# Patient Record
(demographics unavailable — no encounter records)

---

## 2024-10-11 NOTE — HISTORY OF PRESENT ILLNESS
[de-identified] : The patient is a 43 year old female who played college volleyball. She notes back in 2003 is when her LBP first began. In april of this year her Right LBP started to become much worse. In now radiates posteriorly down her right leg/hamstring down the backside of her calf and to her foot. She reports pins and needles down her entire right leg to her foot and notes recently feels her right toes are weak. She is ambulating with crutches and notes the pain is significantly her quality of life.

## 2024-10-11 NOTE — ASSESSMENT
[FreeTextEntry1] : I, Dr. Smith, personally performed the evaluation and management (E/M) services for this new patient who presents today with (a) new problem. That E/M includes conducting the examination, assessing all new/exacerbated conditions, and establishing a new plan of care. Today, my ACP, Chhaya Edge, was here to observe my evaluation and management services for this new problem/exacerbated condition to be followed going forward.  PLAN: -  surgical intervention of right sided L5-S1 microdiscectomy 10/29/24 - preop clearance packet provided to patient - [facility] wipes and education provided

## 2024-10-11 NOTE — REVIEW OF SYSTEMS
[Leg Weakness] : leg weakness [Numbness] : numbness [Tingling] : tingling [Difficulty Walking] : difficulty walking [As Noted in HPI] : as noted in HPI [Chest Pain] : no chest pain [Palpitations] : no palpitations [Shortness Of Breath] : no shortness of breath

## 2024-10-11 NOTE — ADDENDUM
[FreeTextEntry1] : Patient Name: ADY BALLARD  Chief Complaint (CC): - Patient complains of ongoing pain in the back and leg.  History of Present Illness: - The 23-year-old patient reported having several medical issues including depression, a neurological condition involving 'bubbles on the membrane', and unspecified gastrointestinal issues. The neurological condition has been under monitoring by a specialist from [facility] for several years, but it has not grown nor applied any pressure, according to the doctors. Despite this, the patient experiences constant aches. The patient also reported taking Tofranil and Cyclobenzaprine for her conditions, along with a pain medication which name she couldn't recall. Recently, she developed a pain that started in her lower back in April after a long flight and progressively spread to her leg. The patient confirmed she had back pain during college when she played volleyball, indicating a possible long-standing issue with her back.  Past Medical History (PMH): - The patient has a history of depression, a diagnosed neurological condition monitored by a neurologist at [facility], and unspecified gastrointestinal issues. The patient has never had surgery before.  Family History (FH): - The patient's brother has a history of chronic back pain and has undergone back surgery.  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 She used to be an athlete, specifically a  in college.  Medications: - The patient is taking Tofranil, Cyclobenzaprine, and an unidentified pain medication recently prescribed by another doctor.  Allergies: - The patient reported no known allergies to any medication.  Review of Systems (ROS): - The patient reported discomfort in her lower back that progressively moved to her legs over a span of a few months. She also reported that her right foot felt very numb along with pain primarily in the back of her leg, which has decreased recently.  Physical Examination (PE): - Upon physical examination, the patient demonstrated difficulty standing on the toes of her right foot and experienced tightness when her leg was lifted. However, she was able to walk on her heels.  Laboratory/Data Review: - MRI images revealed the presence of a large disc herniation.  Assessment: - Based on the patient's history, symptoms, and the MRI results, she has a large disc herniation which most likely is causing her current back and leg pain, and weakness in her right foot.  Plan: - Given the size and location of the disc herniation, and considering the patient's physical examination, I recommend surgery. It seems the patient is an appropriate candidate for the procedure. Despite possible persistent numbness post operation, there is a high likelihood (approximately 95%) that the surgery will alleviate her pain. The recovery of strength tends to be a slow process. Other potential risks to consider include recurrence of the disc herniation, a spinal fluid leak, and injury to the nerve root. However, these are rare and can be managed.  Preventive Health: - Post-surgical measures will include careful monitoring of the patient's progress, physical therapy, and possibly adjusting her usage of current medications. A follow-up appointment will be set to discuss and finalize treatment options.   Playback Note copy-icon Copy to Clipboard Patient Name: ADY BALLARD  Chief Complaint (CC): - Patient presents with discomfort and weakness possibly due to disc problems.  History of Present Illness: - The patient has been experiencing discomfort and weakness associated to the problem areas identified in the spine - L3-4, L4-5, and L5-S1. The patient's discomfort stems from the worn discs in these regions and a large disc fragment at L5-1 that is protruding and impacting nerve roots. These disc conditions seems to have been developed over time and possibly contributed by aging. The condition persisted for a significant time before the discussion on possible surgical operations took place. The patient's MRI results are from two months ago.  Past Medical History (PMH): - No past medical history provided during this conversation.  Family History (FH): - Family medical history was not discussed during this visit.  Social History (SH): - Nothing about the patient's lifestyle or personal habits was discussed.  Medications: - No current medications were mentioned.  Allergies: - No known allergies were mentioned during this visit.  Review of Systems (ROS): - Review of spine revealed disc degradation at L3-4, L4-5, and L5-S1 as well as a large fragment disc at the L5-1.  Physical Examination (PE): - Visual interpretation of spine imagery showed significant signs of disc degradation.  Laboratory/Data Review: - Spinal imagery, specifically a MRI from August, showed signs of disc degradation.  Assessment: - The patient presents disc degradation at the L3-4, L4-5, and L5-S1 levels. The disc at L5-S1 has a large fragment that is impacting nerve roots causing discomfort and weakness in his foot. This condition is more common in older individuals  Plan: - Treatment plan involves spinal surgery to remove the large fragment in the L5-S1 disc. The operation, a right-sided L5 S1 microdiscectomy, involves removing the fragment and relieving the pressure on the nerve root. A pre-surgical medical clearance will be conducted consisting of lab tests, a chest X-ray and other checks to ensure the patient is fit for surgery. The surgery can be scheduled with a couple of weeks' notice. Potential for post-operative physical therapy depending on the recovery observations.  Preventive Health: - Discussion about possibility of repetitive epidural injections revealed potential risks including the increase in infection risk and potential for osteoporosis, especially for women. These potential consequences indicate the need for considering these risks before administering repetitive epidurals

## 2024-10-11 NOTE — ADDENDUM
[FreeTextEntry1] : Patient Name: ADY BALLARD  Chief Complaint (CC): - Patient complains of ongoing pain in the back and leg.  History of Present Illness: - The 23-year-old patient reported having several medical issues including depression, a neurological condition involving 'bubbles on the membrane', and unspecified gastrointestinal issues. The neurological condition has been under monitoring by a specialist from [facility] for several years, but it has not grown nor applied any pressure, according to the doctors. Despite this, the patient experiences constant aches. The patient also reported taking Tofranil and Cyclobenzaprine for her conditions, along with a pain medication which name she couldn't recall. Recently, she developed a pain that started in her lower back in April after a long flight and progressively spread to her leg. The patient confirmed she had back pain during college when she played volleyball, indicating a possible long-standing issue with her back.  Past Medical History (PMH): - The patient has a history of depression, a diagnosed neurological condition monitored by a neurologist at [facility], and unspecified gastrointestinal issues. The patient has never had surgery before.  Family History (FH): - The patient's brother has a history of chronic back pain and has undergone back surgery.  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  Medications: - The patient is taking Tofranil, Cyclobenzaprine, and an unidentified pain medication recently prescribed by another doctor.  Allergies: - The patient reported no known allergies to any medication.  Review of Systems (ROS): - The patient reported discomfort in her lower back that progressively moved to her legs over a span of a few months. She also reported that her right foot felt very numb along with pain primarily in the back of her leg, which has decreased recently.  Physical Examination (PE): - Upon physical examination, the patient demonstrated difficulty standing on the toes of her right foot and experienced tightness when her leg was lifted. However, she was able to walk on her heels.  Laboratory/Data Review: - MRI images revealed the presence of a large disc herniation.  Assessment: - Based on the patient's history, symptoms, and the MRI results, she has a large disc herniation which most likely is causing her current back and leg pain, and weakness in her right foot.  Plan: - Given the size and location of the disc herniation, and considering the patient's physical examination, I recommend surgery. It seems the patient is an appropriate candidate for the procedure. Despite possible persistent numbness post operation, there is a high likelihood (approximately 95%) that the surgery will alleviate her pain. The recovery of strength tends to be a slow process. Other potential risks to consider include recurrence of the disc herniation, a spinal fluid leak, and injury to the nerve root. However, these are rare and can be managed.  Preventive Health: - Post-surgical measures will include careful monitoring of the patient's progress, physical therapy, and possibly adjusting her usage of current medications. A follow-up appointment will be set to discuss and finalize treatment options.   Playback Note copy-icon Copy to Clipboard Patient Name: ADY BALLARD  Chief Complaint (CC): - Patient presents with discomfort and weakness possibly due to disc problems.  History of Present Illness: - The patient has been experiencing discomfort and weakness associated to the problem areas identified in the spine - L3-4, L4-5, and L5-S1. The patient's discomfort stems from the worn discs in these regions and a large disc fragment at L5-1 that is protruding and impacting nerve roots. These disc conditions seems to have been developed over time and possibly contributed by aging. The condition persisted for a significant time before the discussion on possible surgical operations took place. The patient's MRI results are from two months ago.  Past Medical History (PMH): - No past medical history provided during this conversation.  Family History (FH): - Family medical history was not discussed during this visit.  Social History (SH): - Nothing about the patient's lifestyle or personal habits was discussed.  Medications: - No current medications were mentioned.  Allergies: - No known allergies were mentioned during this visit.  Review of Systems (ROS): - Review of spine revealed disc degradation at L3-4, L4-5, and L5-S1 as well as a large fragment disc at the L5-1.  Physical Examination (PE): - Visual interpretation of spine imagery showed significant signs of disc degradation.  Laboratory/Data Review: - Spinal imagery, specifically a MRI from August, showed signs of disc degradation.  Assessment: - The patient presents disc degradation at the L3-4, L4-5, and L5-S1 levels. The disc at L5-S1 has a large fragment that is impacting nerve roots causing discomfort and weakness in his foot. This condition is more common in older individuals  Plan: - Treatment plan involves spinal surgery to remove the large fragment in the L5-S1 disc. The operation, a right-sided L5 S1 microdiscectomy, involves removing the fragment and relieving the pressure on the nerve root. A pre-surgical medical clearance will be conducted consisting of lab tests, a chest X-ray and other checks to ensure the patient is fit for surgery. The surgery can be scheduled with a couple of weeks' notice. Potential for post-operative physical therapy depending on the recovery observations.  Preventive Health: - Discussion about possibility of repetitive epidural injections revealed potential risks including the increase in infection risk and potential for osteoporosis, especially for women. These potential consequences indicate the need for considering these risks before administering repetitive epidurals

## 2024-10-11 NOTE — HISTORY OF PRESENT ILLNESS
[de-identified] : The patient is a 43 year old female who played college volleyball. She notes back in 2003 is when her LBP first began. In april of this year her Right LBP started to become much worse. In now radiates posteriorly down her right leg/hamstring down the backside of her calf and to her foot. She reports pins and needles down her entire right leg to her foot and notes recently feels her right toes are weak. She is ambulating with crutches and notes the pain is significantly her quality of life.

## 2024-11-04 NOTE — HISTORY OF PRESENT ILLNESS
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 She notes back in 2003 is when her LBP first began. In april of this year her Right LBP started to become much worse. In now radiates posteriorly down her right leg/hamstring down the backside of her calf and to her foot. She reports pins and needles down her entire right leg to her foot and notes recently feels her right toes are weak. She is ambulating with crutches and notes the pain is significantly her quality of life.  10/22/24: Patient states that she bent over and slightly twisted picking an item up in the bathroom. At that time she had pain down her right leg. SHe rested and took 3 days of around the clock motrin and pain as resolved. She denies drainage from incision, denies fever and chills. She is eating and drinking well. SHe is voiding and having BM's. reports minimal pain to leg and lower back now.  10/29/24: Pain is almost resolved. She did notice a few days of left foot swelling. Duplex completed and negative for clot. No swelling to foot noted today. She states she still has right lateral foot numbness but strength has improved

## 2024-11-04 NOTE — REASON FOR VISIT
Will refill for 90 tabs but encourage in office or video follow up when possible [Spouse] : spouse [de-identified] : Microdiskectomy, L5-S1 on the right side. [de-identified] : 10/18/24

## 2024-11-04 NOTE — ASSESSMENT
[FreeTextEntry1] : Incision is healing well without evidence of infection. LBP and right leg pain improved. Residual right lateral foot numbness. RTC in 2 weeks for post-op with Dr. Smith

## 2024-11-04 NOTE — PHYSICAL EXAM
[General Appearance - Alert] : alert [General Appearance - Well Nourished] : well nourished [General Appearance - Well Developed] : well developed [Healing Well] : healing well [Intact] : intact [No Drainage] : without drainage [Normal Skin] : normal [Erythema] : not erythematous [Tender] : not tender [Warm] : not warm [Indurated] : not indurated [Normal Skin Turgor] : skin turgor was normal [Oriented To Time, Place, And Person] : oriented to person, place, and time [Motor Strength] : muscle strength was normal in all four extremities [Involuntary Movements] : no involuntary movements were seen [No Muscle Atrophy] : normal bulk in all four extremities [5] : S1 toe walking 5/5 [Abnormal Walk] : normal gait [Balance] : balance was intact

## 2024-11-17 NOTE — HISTORY OF PRESENT ILLNESS
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 She notes back in 2003 is when her LBP first began. In april of this year her Right LBP started to become much worse. In now radiates posteriorly down her right leg/hamstring down the backside of her calf and to her foot. She reports pins and needles down her entire right leg to her foot and notes recently feels her right toes are weak. She is ambulating with crutches and notes the pain is significantly her quality of life.  10/22/24: Patient states that she bent over and slightly twisted picking an item up in the bathroom. At that time she had pain down her right leg. SHe rested and took 3 days of around the clock motrin and pain as resolved. She denies drainage from incision, denies fever and chills. She is eating and drinking well. SHe is voiding and having BM's. reports minimal pain to leg and lower back now.  10/29/24: Pain is almost resolved. She did notice a few days of left foot swelling. Duplex completed and negative for clot. No swelling to foot noted today. She states she still has right lateral foot numbness but strength has improved  TODAY: 11/21/24

## 2024-11-17 NOTE — ASSESSMENT
[FreeTextEntry1] : I, Dr. Smith, personally performed the evaluation and management (E/M) services for this established patient who presents today with an existing condition(s). That E/M includes conducting the examination, assessing all new/exacerbated conditions, and establishing a new plan of care. Today, my ACP, Chhaya Edge, was here to observe my evaluation and management services for this new problem/exacerbated condition to be followed going forward.  PLAN: -

## 2024-11-17 NOTE — REASON FOR VISIT
[Spouse] : spouse [de-identified] : Microdiskectomy, L5-S1 on the right side. [de-identified] : 10/18/24

## 2024-11-17 NOTE — REASON FOR VISIT
[Spouse] : spouse [de-identified] : Microdiskectomy, L5-S1 on the right side. [de-identified] : 10/18/24